# Patient Record
(demographics unavailable — no encounter records)

---

## 2024-12-11 NOTE — HISTORY OF PRESENT ILLNESS
[FreeTextEntry1] : Latasha is a 13-year-old female with history for left knee osteochondral fracture with loose body s/p removal 4/22/24 who presents today accompanied by her father for evaluation of her left knee with complaint of new injury.   She reports that she had been doing very well since last visit in May 2024 until 11/27/24, when she sustained a new injury to her left knee. She reports that she fell after tripping over her own foot while walking while at school. She thinks she fell on the left knee but has been told by teachers who witnessed it that she actually fell on her right knee. She was able to stand and walk down steps, but was doing so with a limp. She was sent to the nurse, who called parent and child was taken home. She had swelling to the knee. Due to continued pain and difficulty with walking, she was [place] [facility] [place] following [place] where XR were performed. A small avulsion fracture and a moderate efusion was noted on films and she was provided with a knee immobilizer and crutches with intstruction to follow up with orthopedics. She stopped using these due to finding them a bit frustrating and rather stayed on the couch, missing 1 week of school. She reports starting to feel better around 12/5, where she began walking more normally. She returned to school yesterday, using elevator and staying out of gym activities. She is using a copper knee sleeve which father purchased over the counter. She feels it is helpful. She feels like this pain/injury is different than last time. She does not report feeling her kneecap dislocate. Here today for further orthopedic evaluation and management.

## 2024-12-11 NOTE — HISTORY OF PRESENT ILLNESS
[FreeTextEntry1] : Latasha is a 13-year-old female with history for left knee osteochondral fracture with loose body s/p removal 4/22/24 who presents today accompanied by her father for evaluation of her left knee with complaint of new injury.   She reports that she had been doing very well since last visit in May 2024 until 11/27/24, when she sustained a new injury to her left knee. She reports that she fell after tripping over her own foot while walking while at school. She thinks she fell on the left knee but has been told by teachers who witnessed it that she actually fell on her right knee. She was able to stand and walk down steps, but was doing so with a limp. She was sent to the nurse, who called parent and child was taken home. She had swelling to the knee. Due to continued pain and difficulty with walking, she was brought to the [facility] the following day where XR were performed. A small avulsion fracture and a moderate efusion was noted on films and she was provided with a knee immobilizer and crutches with intstruction to follow up with orthopedics. She stopped using these due to finding them a bit frustrating and rather stayed on the couch, missing 1 week of school. She reports starting to feel better around 12/5, where she began walking more normally. She returned to school yesterday, using elevator and staying out of gym activities. She is using a copper knee sleeve which father purchased over the counter. She feels it is helpful. She feels like this pain/injury is different than last time. She does not report feeling her kneecap dislocate. Here today for further orthopedic evaluation and management.

## 2024-12-11 NOTE — DATA REVIEWED
[de-identified] : 11/28/24 XR at [facility] in [facility] reviewed today. Imaging demonstrates a small avulsion fracture in the joint space, best noted on lateral view. There is a moderate effusion present.

## 2024-12-11 NOTE — DATA REVIEWED
[de-identified] : 11/28/24 [place] [facility] [place] [facility] reviewed today. Imaging demonstrates a small avulsion fracture in the joint space, best noted on lateral view. There is a moderate effusion present.

## 2024-12-11 NOTE — ASSESSMENT
[FreeTextEntry1] : Latasha is a 13 year-old female with new left knee injury with effusion and radiographic evidence of bony avulsion fragment. DOI 11/27/24  The history was obtained today from the child and parent; given the patient's age and/or the child's mental capacity, the history was unreliable and the parent was used as an independent historian.   Latasha is well known to the practice, as she has a history for left knee osteochondral fracture and loose body s/p excision of loose body in April 2024. She now presents with a new injury to the same knee. She sustained a fall which resulted in an effusion and difficulty with range of motion and ambulation. While her symptoms are improving, she continues to have an effusion today along with radiographs which suggested an avulsion fracture. I explained to family that further advanced imaging is needed at this time to better evaluate the nature of her injury. It will allow us to better see the area of concern and understand where the fragment is coming from, along with the extent of the damage. We briefly discussed that treatment may include another surgical procedure to remove this fragment, pending imaging. My office will reach out to family to help set up MRI imaging of the left knee. Father will reach out to me via e-mail once imaging complete so I can review the results. For now,  no gym or sports. Elevator access was requested at school. This plan was discussed with family and all questions and concerns were addressed today.  I, Gunjan Smiley PA-C, have acted as a scribe and documented the above for Dr. Grady Carrillo  The above documentation completed by the scribe is an accurate record of both my words and actions.  [employer]

## 2024-12-11 NOTE — REASON FOR VISIT
[Acute] : an acute visit [Patient] : patient [Father] : father [FreeTextEntry1] : new left knee injury 11/27/24

## 2024-12-11 NOTE — PHYSICAL EXAM
[FreeTextEntry1] : Healthy appearing 13 year-old child. Awake, alert, in no acute distress. Pleasant and cooperative.  Eyes are clear with no sclera abnormalities. External ears, nose and mouth are clear.  Good respiratory effort with no audible wheezing without use of a stethoscope. Ambulates independently with no significant limp. Good coordination and balance. Able to get on and off exam table without difficulty.   Left knee exam: Skin is clean, dry and intact. There is no erythema or ecchymosis. Mild to moderate resolving effusion present. There is no tenderness with palpation of patella, patella tendon, MLJS, proximal tibia.  Negative Patella Grind.  Joint is stable to varus and valgus stresses. Negative Lachman/Anterior Drawer. Negative McMurrays. ROM of the knee 0-130. SLR against resistance intact, 5/5 strength Sensation is grossly intact. DP 2+, Brisk Capillary refill in all digits.